# Patient Record
Sex: FEMALE | Race: NATIVE HAWAIIAN OR OTHER PACIFIC ISLANDER | Employment: FULL TIME | URBAN - METROPOLITAN AREA
[De-identification: names, ages, dates, MRNs, and addresses within clinical notes are randomized per-mention and may not be internally consistent; named-entity substitution may affect disease eponyms.]

---

## 2017-01-12 PROBLEM — G89.29 CHRONIC PAIN OF BOTH SHOULDERS: Status: ACTIVE | Noted: 2017-01-12

## 2017-01-12 PROBLEM — M25.511 CHRONIC PAIN OF BOTH SHOULDERS: Status: ACTIVE | Noted: 2017-01-12

## 2017-01-12 PROBLEM — M25.512 CHRONIC PAIN OF BOTH SHOULDERS: Status: ACTIVE | Noted: 2017-01-12

## 2017-01-26 PROBLEM — M75.121 COMPLETE TEAR OF RIGHT ROTATOR CUFF: Status: ACTIVE | Noted: 2017-01-26

## 2017-01-26 PROBLEM — M75.82 ROTATOR CUFF TENDINITIS, LEFT: Status: ACTIVE | Noted: 2017-01-26

## 2018-04-09 ENCOUNTER — APPOINTMENT (OUTPATIENT)
Dept: LAB | Age: 48
End: 2018-04-09
Attending: OTOLARYNGOLOGY
Payer: COMMERCIAL

## 2018-04-09 PROBLEM — K13.79 ORAL MUCOCELE: Status: ACTIVE | Noted: 2018-04-09

## 2018-04-09 PROCEDURE — 88304 TISSUE EXAM BY PATHOLOGIST: CPT | Performed by: OTOLARYNGOLOGY

## 2020-11-06 ENCOUNTER — OFFICE VISIT (OUTPATIENT)
Dept: FAMILY MEDICINE CLINIC | Facility: CLINIC | Age: 50
End: 2020-11-06
Payer: COMMERCIAL

## 2020-11-06 VITALS
HEIGHT: 63 IN | TEMPERATURE: 98 F | WEIGHT: 182 LBS | OXYGEN SATURATION: 100 % | SYSTOLIC BLOOD PRESSURE: 132 MMHG | HEART RATE: 86 BPM | DIASTOLIC BLOOD PRESSURE: 84 MMHG | RESPIRATION RATE: 16 BRPM | BODY MASS INDEX: 32.25 KG/M2

## 2020-11-06 DIAGNOSIS — Z11.59 SCREENING FOR VIRAL DISEASE: Primary | ICD-10-CM

## 2020-11-06 PROCEDURE — 3075F SYST BP GE 130 - 139MM HG: CPT | Performed by: FAMILY MEDICINE

## 2020-11-06 PROCEDURE — 3079F DIAST BP 80-89 MM HG: CPT | Performed by: FAMILY MEDICINE

## 2020-11-06 PROCEDURE — 99213 OFFICE O/P EST LOW 20 MIN: CPT | Performed by: FAMILY MEDICINE

## 2020-11-06 PROCEDURE — 3008F BODY MASS INDEX DOCD: CPT | Performed by: FAMILY MEDICINE

## 2020-11-06 PROCEDURE — 99072 ADDL SUPL MATRL&STAF TM PHE: CPT | Performed by: FAMILY MEDICINE

## 2020-11-07 NOTE — PROGRESS NOTES
CHIEF COMPLAINT:   Patient presents with:  Sinus Problem: SOB,dry cough, x 3-4 day ago,         HPI:   Vamshi Segura is a 52year old female presents to clinic with complaints of nasal drip, cough, mild asthma exacerbation. . Patient has had sympt labored. CARDIO: RRR without murmur  EXTREMITIES: no cyanosis, clubbing or edema  LYMPH: no anterior cervical lymphadenopathy, no submandibular lymphadenopathy. No  posterior cervical or occipital lymphadenopathy. ASSESSMENT AND PLAN:   Assessment: 1.

## 2025-01-28 ENCOUNTER — OFFICE VISIT (OUTPATIENT)
Dept: NEPHROLOGY | Facility: CLINIC | Age: 55
End: 2025-01-28
Payer: COMMERCIAL

## 2025-01-28 ENCOUNTER — LAB ENCOUNTER (OUTPATIENT)
Dept: LAB | Age: 55
End: 2025-01-28
Attending: INTERNAL MEDICINE
Payer: COMMERCIAL

## 2025-01-28 VITALS — BODY MASS INDEX: 33 KG/M2 | SYSTOLIC BLOOD PRESSURE: 116 MMHG | DIASTOLIC BLOOD PRESSURE: 80 MMHG | WEIGHT: 186.13 LBS

## 2025-01-28 DIAGNOSIS — R79.89 ELEVATED SERUM CREATININE: Primary | ICD-10-CM

## 2025-01-28 DIAGNOSIS — R79.89 ELEVATED SERUM CREATININE: ICD-10-CM

## 2025-01-28 LAB
ANION GAP SERPL CALC-SCNC: 9 MMOL/L (ref 0–18)
BUN BLD-MCNC: 18 MG/DL (ref 9–23)
CALCIUM BLD-MCNC: 9.4 MG/DL (ref 8.7–10.6)
CHLORIDE SERPL-SCNC: 108 MMOL/L (ref 98–112)
CO2 SERPL-SCNC: 25 MMOL/L (ref 21–32)
CREAT BLD-MCNC: 1.16 MG/DL
EGFRCR SERPLBLD CKD-EPI 2021: 56 ML/MIN/1.73M2 (ref 60–?)
FASTING STATUS PATIENT QL REPORTED: NO
GLUCOSE BLD-MCNC: 87 MG/DL (ref 70–99)
OSMOLALITY SERPL CALC.SUM OF ELEC: 295 MOSM/KG (ref 275–295)
POTASSIUM SERPL-SCNC: 3.9 MMOL/L (ref 3.5–5.1)
SODIUM SERPL-SCNC: 142 MMOL/L (ref 136–145)

## 2025-01-28 PROCEDURE — 82610 CYSTATIN C: CPT | Performed by: INTERNAL MEDICINE

## 2025-01-28 PROCEDURE — 3074F SYST BP LT 130 MM HG: CPT | Performed by: INTERNAL MEDICINE

## 2025-01-28 PROCEDURE — 80048 BASIC METABOLIC PNL TOTAL CA: CPT | Performed by: INTERNAL MEDICINE

## 2025-01-28 PROCEDURE — 3079F DIAST BP 80-89 MM HG: CPT | Performed by: INTERNAL MEDICINE

## 2025-01-28 PROCEDURE — 99204 OFFICE O/P NEW MOD 45 MIN: CPT | Performed by: INTERNAL MEDICINE

## 2025-01-28 NOTE — PROGRESS NOTES
Nephrology Consult Note    REASON FOR CONSULT: Elevated creatinine    ASSESSMENT/PLAN:      1) Elevated SCr- recent creatinine approx 1.1 mg/dl (previously 0.9-1.0 mg/dl x yrs) likely reflects mild prerenal azotemia as she is otherwise in good health and does not have other risk factors for kidney disease.  Urine sediment historically bland without microscopic hematuria or proteinuria does not support an underlying glomerulopathy or interstitial nephritis.  Meds are benign without chronic analgesic or PPI use.  There is no history of obstructive uropathy, urinary retention or nephrolithiasis.  There are no signs of a systemic process such as vasculitis or autoimmune disease.  She does not have elevated serum globulins or hypercalcemia to suggest a plasma cell dyscrasia.  There is no family history of primary kidney disease.  She does not have HIV or hepatitis risk factors. PLAN- d/w pt at length.  Reviewed previous labs, pathophysiology and significance of creatinine levels. Will obtain nonfasting SCr + cystatin C (which may be a better indicator of renal function in the setting) before pursuing further w/u.    2) h/o gastric sleeve 2013       HPI:   Nieves Tran is a 55 year old female with   Chief Complaint   Patient presents with    Consult     Elevated creatinine     No primary care provider on file.    Very pleasant 55-year-old female presents for evaluation of elevated serum creatinine; please see above for further details.  Prior to mid 2024, her serum creatinines have been is 0.9 to 1.0 mg/dL range denies any previous history of kidney disease although she may have seen nephrologist over 10 years ago after the birth of her youngest son and may have had imaging and was told there was no evidence of kidney disease.  She is not hypertensive or diabetic.  There is no history of cardiac pulmonary or hepatic disease.    ROS:    Denies fever/chills  Denies wt loss/gain  Denies HA or visual changes  Denies  CP or palpitations  Denies SOB/cough/hemoptysis  Denies abd or flank pain  Denies N/V/D  Denies change in urinary habits or gross hematuria  Denies LE edema  Denies skin rashes/myalgias/arthralgias    PMH:  Past Medical History:    Fibromyalgia       PSH:  No past surgical history on file.    Medications (Active prior to today's visit):  Current Outpatient Medications   Medication Sig Dispense Refill    triamcinolone acetonide 0.1 % External Cream Apply thin layer to AA of skin  BID x 2 weeks, hold 1 week, repeat PRN. 60 g 1    Clindamycin Phosphate 1 % External Gel APPLY TO FACE TWICE DAILY 60 g 5    spironolactone 50 MG Oral Tab Take 1 tablet (50 mg total) by mouth daily. 30 tablet 5    Tretinoin 0.025 % External Gel Apply a grain of rice to pea size amount to face QHS.  May mix with an oil free moisturizer 45 g 3    Diclofenac Sodium 1 % Transdermal Gel Apply 2 g topically 3 (three) times daily as needed. 2 Tube 0    Cyclobenzaprine HCl (FLEXERIL) 10 MG Oral Tab Take 1 tablet (10 mg total) by mouth nightly. 30 tablet 1    Sulfacetamide Sodium, Acne, (KLARON) 10 % External Lotion Apply to face  mL 3    hydrocortisone 2.5 % External Cream Apply to face BID PRN 30 g 2    DULoxetine HCl 20 MG Oral Cap DR Particles TAKE 1 CAPSULE BY MOUTH DAILY      omeprazole 20 MG Oral Capsule Delayed Release Take 1 capsule (20 mg total) by mouth.      ALPRAZolam 1 MG Oral Tab Take 1 tablet (1 mg total) by mouth.      Albuterol Sulfate  (90 Base) MCG/ACT Inhalation Aero Soln Inhale 2 puffs into the lungs.         Allergies:  Allergies[1]    Social History:  Social History     Socioeconomic History    Marital status:    Tobacco Use    Smoking status: Never    Smokeless tobacco: Never        Family History:  Denies family history of kidney disease.    PHYSICAL EXAM:   /80   Wt 186 lb 2 oz (84.4 kg)   BMI 32.97 kg/m²    Wt Readings from Last 3 Encounters:   01/28/25 186 lb 2 oz (84.4 kg)   11/06/20 182 lb  (82.6 kg)   10/28/19 170 lb (77.1 kg)     General: Alert and oriented in no apparent distress.  HEENT: No scleral icterus, MMM  Neck: Supple, no WHITNEY or thyromegaly  Cardiac: Regular rate and rhythm, S1, S2 normal, no murmur or rub  Lungs: Clear without wheezes, rales, rhonchi.    Abdomen: Soft, non-tender. + bowel sounds, no palpable organomegaly  Extremities: Without clubbing, cyanosis or edema.  Neurologic:  normal affect, cranial nerves grossly intact, moving all extremities  Skin: Warm and dry, no rashes        Sierra Clifton MD  1/28/2025  4:17 PM         [1]   Allergies  Allergen Reactions    Aspirin NAUSEA AND VOMITING    Ibuprofen NAUSEA AND VOMITING

## 2025-01-29 LAB
CYSTATIN C: 0.89 MG/L
EGFR: 87 ML/MIN/1.73